# Patient Record
Sex: MALE | Race: BLACK OR AFRICAN AMERICAN | Employment: FULL TIME | URBAN - METROPOLITAN AREA
[De-identification: names, ages, dates, MRNs, and addresses within clinical notes are randomized per-mention and may not be internally consistent; named-entity substitution may affect disease eponyms.]

---

## 2023-08-10 ENCOUNTER — OFFICE VISIT (OUTPATIENT)
Age: 38
End: 2023-08-10

## 2023-08-10 VITALS
HEIGHT: 77 IN | HEART RATE: 79 BPM | BODY MASS INDEX: 24.44 KG/M2 | SYSTOLIC BLOOD PRESSURE: 150 MMHG | WEIGHT: 207 LBS | DIASTOLIC BLOOD PRESSURE: 92 MMHG

## 2023-08-10 DIAGNOSIS — Z76.89 ENCOUNTER TO ESTABLISH CARE: Primary | ICD-10-CM

## 2023-08-10 DIAGNOSIS — I10 HYPERTENSION, ESSENTIAL: ICD-10-CM

## 2023-08-10 LAB
ALBUMIN SERPL-MCNC: 4.4 G/DL (ref 3.5–5)
ALBUMIN/GLOB SERPL: 1.3 (ref 0.4–1.6)
ALP SERPL-CCNC: 64 U/L (ref 50–136)
ALT SERPL-CCNC: 73 U/L (ref 12–65)
ANION GAP SERPL CALC-SCNC: 8 MMOL/L (ref 2–11)
AST SERPL-CCNC: 30 U/L (ref 15–37)
BASOPHILS # BLD: 0.1 K/UL (ref 0–0.2)
BASOPHILS NFR BLD: 1 % (ref 0–2)
BILIRUB DIRECT SERPL-MCNC: <0.1 MG/DL
BILIRUB SERPL-MCNC: 0.3 MG/DL (ref 0.2–1.1)
BUN SERPL-MCNC: 14 MG/DL (ref 6–23)
CALCIUM SERPL-MCNC: 9.5 MG/DL (ref 8.3–10.4)
CHLORIDE SERPL-SCNC: 107 MMOL/L (ref 101–110)
CHOLEST SERPL-MCNC: 205 MG/DL
CO2 SERPL-SCNC: 26 MMOL/L (ref 21–32)
CREAT SERPL-MCNC: 1.2 MG/DL (ref 0.8–1.5)
DIFFERENTIAL METHOD BLD: ABNORMAL
EOSINOPHIL # BLD: 0.5 K/UL (ref 0–0.8)
EOSINOPHIL NFR BLD: 7 % (ref 0.5–7.8)
ERYTHROCYTE [DISTWIDTH] IN BLOOD BY AUTOMATED COUNT: 14.6 % (ref 11.9–14.6)
GLOBULIN SER CALC-MCNC: 3.5 G/DL (ref 2.8–4.5)
GLUCOSE SERPL-MCNC: 88 MG/DL (ref 65–100)
HCT VFR BLD AUTO: 46.2 % (ref 41.1–50.3)
HDLC SERPL-MCNC: 38 MG/DL (ref 40–60)
HDLC SERPL: 5.4
HGB BLD-MCNC: 14.3 G/DL (ref 13.6–17.2)
IMM GRANULOCYTES # BLD AUTO: 0 K/UL (ref 0–0.5)
IMM GRANULOCYTES NFR BLD AUTO: 0 % (ref 0–5)
LDLC SERPL CALC-MCNC: 129.6 MG/DL
LYMPHOCYTES # BLD: 2.4 K/UL (ref 0.5–4.6)
LYMPHOCYTES NFR BLD: 38 % (ref 13–44)
MCH RBC QN AUTO: 23.3 PG (ref 26.1–32.9)
MCHC RBC AUTO-ENTMCNC: 31 G/DL (ref 31.4–35)
MCV RBC AUTO: 75.4 FL (ref 82–102)
MONOCYTES # BLD: 0.5 K/UL (ref 0.1–1.3)
MONOCYTES NFR BLD: 7 % (ref 4–12)
NEUTS SEG # BLD: 3 K/UL (ref 1.7–8.2)
NEUTS SEG NFR BLD: 47 % (ref 43–78)
NRBC # BLD: 0 K/UL (ref 0–0.2)
PLATELET # BLD AUTO: 302 K/UL (ref 150–450)
PMV BLD AUTO: 10.8 FL (ref 9.4–12.3)
POTASSIUM SERPL-SCNC: 4.3 MMOL/L (ref 3.5–5.1)
PROT SERPL-MCNC: 7.9 G/DL (ref 6.3–8.2)
RBC # BLD AUTO: 6.13 M/UL (ref 4.23–5.6)
SODIUM SERPL-SCNC: 141 MMOL/L (ref 133–143)
TRIGL SERPL-MCNC: 187 MG/DL (ref 35–150)
VLDLC SERPL CALC-MCNC: 37.4 MG/DL (ref 6–23)
WBC # BLD AUTO: 6.4 K/UL (ref 4.3–11.1)

## 2023-08-10 RX ORDER — AMLODIPINE BESYLATE 5 MG/1
TABLET ORAL
COMMUNITY
Start: 2022-04-01 | End: 2023-08-10 | Stop reason: SDUPTHER

## 2023-08-10 RX ORDER — AMLODIPINE BESYLATE 5 MG/1
5 TABLET ORAL DAILY
Qty: 30 TABLET | Refills: 3 | Status: SHIPPED | OUTPATIENT
Start: 2023-08-10 | End: 2023-11-08

## 2023-08-10 ASSESSMENT — ENCOUNTER SYMPTOMS
NAIL CHANGES: 0
APHONIA: 0
ABDOMINAL PAIN: 0
STRIDOR: 0
COUGH: 0
EYE PAIN: 0

## 2023-08-10 NOTE — PROGRESS NOTES
[address] [facility], [facility], [address]  PHONE: [phone]    SUBJECTIVE:   Tay Finney is a 41 y.o. male 1985   seen for a consultation visit regarding the following:     Chief Complaint   Patient presents with    Palpitations    New Patient              Consultation is requested by for evaluation of Palpitations and New Patient   . History of present illness: 41 y.o. male history of hypertension mitral valve prolapse patient with limited symptoms did describe shortness of breath occasionally. Cardiac History:  8/10/2023 Sinus  Rhythm   Voltage criteria for LVH  (S(V1)+R(V5) exceeds 4.00 mV)  -Voltage criteria w/o ST/T abnormality may be normal.     Assessment:  Hypertension  Additional potential secondary cause of hypertension include uncontrolled obstructive sleep apnea, high sodium intake, renal artery stenosis, Cushing's syndrome, pheochromocytoma, hyperaldosteronism. Work-up would include comprehensive evaluation to ensure that sleep apnea is adequately diagnosed and treated, reducing salt intake after counseling regarding dietary modifications. Following this, urine sodium levels can be checked to see if patient able to abstain from sodium, renal artery ultrasound, dexamethasone suppression testing, urine metanephrines, serum renin and aldosterone levels. Resume amlodipine  Mitral valve prolapse  Murmur      Past Medical History, Past Surgical History, Family history, Social History, and Medications were all reviewed with the patient today and updated as necessary. Past medical history  Mitral valve prolapse    Past surgical history  Lithotripsy    Social History     Tobacco Use    Smoking status: Never     Passive exposure: Never    Smokeless tobacco: Never   Substance Use Topics    Alcohol use: Not on file       ROS:    Review of Systems   Constitutional: Negative for fever. HENT:  Negative for stridor. Eyes:  Negative for pain.    Cardiovascular:  Negative for

## 2023-11-16 ENCOUNTER — OFFICE VISIT (OUTPATIENT)
Age: 38
End: 2023-11-16
Payer: COMMERCIAL

## 2023-11-16 VITALS
DIASTOLIC BLOOD PRESSURE: 88 MMHG | HEART RATE: 68 BPM | SYSTOLIC BLOOD PRESSURE: 138 MMHG | WEIGHT: 213 LBS | BODY MASS INDEX: 25.15 KG/M2 | HEIGHT: 77 IN

## 2023-11-16 DIAGNOSIS — E78.00 ELEVATED LDL CHOLESTEROL LEVEL: ICD-10-CM

## 2023-11-16 DIAGNOSIS — I10 HYPERTENSION, ESSENTIAL: Primary | ICD-10-CM

## 2023-11-16 DIAGNOSIS — I34.1 MITRAL VALVE PROLAPSE: ICD-10-CM

## 2023-11-16 PROCEDURE — 3075F SYST BP GE 130 - 139MM HG: CPT | Performed by: INTERNAL MEDICINE

## 2023-11-16 PROCEDURE — 3079F DIAST BP 80-89 MM HG: CPT | Performed by: INTERNAL MEDICINE

## 2023-11-16 PROCEDURE — 99214 OFFICE O/P EST MOD 30 MIN: CPT | Performed by: INTERNAL MEDICINE

## 2023-11-16 RX ORDER — LOSARTAN POTASSIUM 50 MG/1
50 TABLET ORAL DAILY
Qty: 90 TABLET | Refills: 3 | Status: SHIPPED | OUTPATIENT
Start: 2023-11-16

## 2023-11-16 ASSESSMENT — ENCOUNTER SYMPTOMS
ABDOMINAL PAIN: 0
APHONIA: 0
NAIL CHANGES: 0
EYE PAIN: 0
STRIDOR: 0
COUGH: 0

## 2023-11-16 NOTE — PROGRESS NOTES
[address] [facility], [facility], [address]  PHONE: [phone]    SUBJECTIVE:   Ellen Reid is a 41 y.o. male 1985   seen for a consultation visit regarding the following:     Chief Complaint   Patient presents with    Palpitations    Results     echo    Follow-up              Consultation is requested by for evaluation of Palpitations, Results (echo), and Follow-up   . History of present illness: 41 y.o. male history of hypertension mitral valve prolapse patient with limited symptoms did describe shortness of breath occasionally. Cardiac History:  8/10/2023 Sinus  Rhythm   Voltage criteria for LVH  (S(V1)+R(V5) exceeds 4.00 mV)  -Voltage criteria w/o ST/T abnormality may be normal.   9/15/2023 echocardiogram     Left Ventricle: Normal left ventricular systolic function. EF by 2D Simpsons Biplane is 58%. Left ventricle size is normal. Mildly increased wall thickness. Findings consistent with mild concentric hypertrophy. Normal wall motion. Normal diastolic function. Mitral Valve: Findings consistent with myxomatous degeneration. Moderate leaflet prolapse noted of the posterior leaflet. Mild mid to late systolic regurgitation    Tricuspid Valve: Mild regurgitation with a centrally directed jet. The estimated RVSP is 16 mmHg. Assessment:  Hypertension    Change to ARB   Mitral valve prolapse  Mild serial imaging with echocardiogram  Murmur  Hyperlipidemia most recent lipids with       Past Medical History, Past Surgical History, Family history, Social History, and Medications were all reviewed with the patient today and updated as necessary. Past medical history  Mitral valve prolapse    Past surgical history  Lithotripsy    Social History     Tobacco Use    Smoking status: Never     Passive exposure: Never    Smokeless tobacco: Never   Substance Use Topics    Alcohol use: Not on file       ROS:    Review of Systems   Constitutional: Negative for fever.    HENT:  Negative

## 2025-01-23 ENCOUNTER — OFFICE VISIT (OUTPATIENT)
Age: 40
End: 2025-01-23
Payer: COMMERCIAL

## 2025-01-23 VITALS
WEIGHT: 216 LBS | DIASTOLIC BLOOD PRESSURE: 82 MMHG | BODY MASS INDEX: 25.5 KG/M2 | HEIGHT: 77 IN | HEART RATE: 86 BPM | SYSTOLIC BLOOD PRESSURE: 150 MMHG

## 2025-01-23 DIAGNOSIS — E78.00 ELEVATED LDL CHOLESTEROL LEVEL: ICD-10-CM

## 2025-01-23 DIAGNOSIS — I10 HYPERTENSION, ESSENTIAL: ICD-10-CM

## 2025-01-23 DIAGNOSIS — I34.1 MITRAL VALVE PROLAPSE: Primary | ICD-10-CM

## 2025-01-23 PROCEDURE — 3077F SYST BP >= 140 MM HG: CPT | Performed by: INTERNAL MEDICINE

## 2025-01-23 PROCEDURE — 99214 OFFICE O/P EST MOD 30 MIN: CPT | Performed by: INTERNAL MEDICINE

## 2025-01-23 PROCEDURE — 3079F DIAST BP 80-89 MM HG: CPT | Performed by: INTERNAL MEDICINE

## 2025-01-23 RX ORDER — AMLODIPINE BESYLATE 5 MG/1
5 TABLET ORAL DAILY
COMMUNITY
Start: 2025-01-07 | End: 2025-07-06

## 2025-01-23 RX ORDER — METHYLPHENIDATE HYDROCHLORIDE 30 MG/1
30 CAPSULE, EXTENDED RELEASE ORAL DAILY
COMMUNITY
Start: 2025-01-07 | End: 2025-04-07

## 2025-01-23 NOTE — PROGRESS NOTES
[facility], [address], [address]  [place] [address]  PHONE: [phone]    SUBJECTIVE:   Trino Montana is a 39 y.o. male 1985   seen for a follow up visit regarding the following:     Chief Complaint   Patient presents with    Hypertension         History of Present Illness  The patient is a 39-year-old male with a medical history significant for hypertension, mitral valve prolapse, and hyperlipidemia. He was previously evaluated for palpitations.    The patient reports no significant changes in his symptoms since the last consultation. He continues to experience occasional palpitations, described as irregular heartbeats, though he is uncertain whether these episodes are cardiac or muscular in origin. These episodes are infrequent. He maintains an active lifestyle, engaging in physical exercise six times per week, which includes strength training and treadmill workouts. He does not participate in any sports activities.    The patient has not been monitoring his blood pressure at home. His current medications include amlodipine 5 mg, prescribed by his primary care physician in [place], who has assessed his blood pressure to be within acceptable limits. Additionally, he is on Ritalin. He has regular follow-up appointments with his primary care physician every three months, during which his blood pressure is monitored. His most recent blood pressure reading was within the target range.    SOCIAL HISTORY  - Works at [employer] in [place]    MEDICATIONS  Current: Amlodipine, Ritalin.        Interval history:  Cardiac History:  8/10/2023 Sinus  Rhythm   Voltage criteria for LVH  (S(V1)+R(V5) exceeds 4.00 mV)  -Voltage criteria w/o ST/T abnormality may be normal.   9/15/2023 echocardiogram     Left Ventricle: Normal left ventricular systolic function. EF by 2D Simpsons Biplane is 58%. Left ventricle size is normal. Mildly increased wall thickness. Findings consistent with mild